# Patient Record
Sex: MALE | Race: WHITE | Employment: STUDENT | URBAN - METROPOLITAN AREA
[De-identification: names, ages, dates, MRNs, and addresses within clinical notes are randomized per-mention and may not be internally consistent; named-entity substitution may affect disease eponyms.]

---

## 2021-05-25 ENCOUNTER — HOSPITAL ENCOUNTER (EMERGENCY)
Age: 21
Discharge: HOME | End: 2021-05-25
Attending: EMERGENCY MEDICINE
Payer: COMMERCIAL

## 2021-05-25 VITALS
DIASTOLIC BLOOD PRESSURE: 79 MMHG | SYSTOLIC BLOOD PRESSURE: 161 MMHG | OXYGEN SATURATION: 97 % | RESPIRATION RATE: 16 BRPM | HEART RATE: 69 BPM | TEMPERATURE: 98.7 F | BODY MASS INDEX: 23.08 KG/M2 | HEIGHT: 72 IN | WEIGHT: 170.42 LBS

## 2021-05-25 DIAGNOSIS — R59.9 ENLARGED LYMPH NODE: Primary | ICD-10-CM

## 2021-05-25 PROCEDURE — 99282 EMERGENCY DEPT VISIT SF MDM: CPT

## 2021-05-25 RX ORDER — CEPHALEXIN 500 MG/1
500 CAPSULE ORAL 2 TIMES DAILY
Qty: 14 CAPSULE | Refills: 0 | Status: SHIPPED | OUTPATIENT
Start: 2021-05-25 | End: 2021-06-01

## 2021-05-25 NOTE — ED PROVIDER NOTES
This is a 23-year-old male with no reported past medical history presents ER today for evaluation of suspected swollen lymph node. Patient states that he noticed a large swollen lymph node on his left lower neck 2 days ago which has been persistent since he first noticed it. He denies a history of similar symptoms. He denies any fever/chills, night sweats, weight loss, appetite changes, abdominal pain, recent dental procedures, skin/scalp lesions, oral lesions, neck pain/stiffness. There is no known family history of leukemia or lymphoma. History reviewed. No pertinent past medical history. Past Surgical History:   Procedure Laterality Date    HX HEENT      dental    HX HERNIA REPAIR           History reviewed. No pertinent family history. Social History     Socioeconomic History    Marital status: SINGLE     Spouse name: Not on file    Number of children: Not on file    Years of education: Not on file    Highest education level: Not on file   Occupational History    Not on file   Tobacco Use    Smoking status: Never Smoker    Smokeless tobacco: Never Used   Substance and Sexual Activity    Alcohol use: Not Currently    Drug use: Not on file    Sexual activity: Not on file   Other Topics Concern    Not on file   Social History Narrative    Not on file     Social Determinants of Health     Financial Resource Strain:     Difficulty of Paying Living Expenses:    Food Insecurity:     Worried About Running Out of Food in the Last Year:     [address] in the Last Year:    Transportation Needs:     Lack of Transportation (Medical):      Lack of Transportation (Non-Medical):    Physical Activity:     Days of Exercise per Week:     Minutes of Exercise per Session:    Stress:     Feeling of Stress :    Social Connections:     Frequency of Communication with Friends and Family:     Frequency of Social Gatherings with Friends and Family:     Attends Jewish Services:     Active Member of Clubs or Organizations:     Attends Club or Organization Meetings:     Marital Status:    Intimate Partner Violence:     Fear of Current or Ex-Partner:     Emotionally Abused:     Physically Abused:     Sexually Abused: ALLERGIES: Sulfa (sulfonamide antibiotics)    Review of Systems   Constitutional: Negative for chills and fever. HENT: Negative for dental problem, sinus pressure, sore throat and trouble swallowing. Eyes: Negative for visual disturbance. Respiratory: Negative for shortness of breath. Cardiovascular: Negative for palpitations. Gastrointestinal: Negative for vomiting. Genitourinary: Negative for dysuria. Musculoskeletal: Negative for myalgias. Skin: Negative for color change, rash and wound. Allergic/Immunologic: Negative for immunocompromised state. Neurological: Negative for dizziness. Hematological: Positive for adenopathy. Psychiatric/Behavioral: Negative for dysphoric mood. Vitals:    05/25/21 1730 05/25/21 1731 05/25/21 1732 05/25/21 1733   BP:       Pulse:       Resp:       Temp:       SpO2: 97% 98% 97% 97%   Weight:       Height:                Physical Exam  Vitals and nursing note reviewed. Constitutional:       Appearance: Normal appearance. HENT:      Head: Normocephalic and atraumatic. Comments: No skin lesions     Mouth/Throat:      Comments: Normal dentition. No lesions on oral or buccal mucosa. Eyes:      Extraocular Movements: Extraocular movements intact. Cardiovascular:      Rate and Rhythm: Normal rate. Pulmonary:      Effort: Pulmonary effort is normal.   Abdominal:      General: There is no distension. Musculoskeletal:         General: Normal range of motion. Cervical back: Neck supple. Lymphadenopathy:      Head:      Right side of head: No submental, submandibular, tonsillar, preauricular, posterior auricular or occipital adenopathy.       Left side of head: No submental, submandibular, tonsillar, preauricular, posterior auricular or occipital adenopathy. Cervical: Cervical adenopathy present. Comments: Single large isolated lymph node that does not appear to be fixed; located in the left cervical distribution. Minimal tenderness with palpation of the lymph node. Skin:     General: Skin is warm and dry. Coloration: Skin is not pale. Neurological:      Mental Status: He is alert and oriented to person, place, and time. Gait: Gait normal.   Psychiatric:         Behavior: Behavior normal.          MDM      VITAL SIGNS:  Patient Vitals for the past 4 hrs:   Temp Pulse Resp BP SpO2   05/25/21 1733     97 %   05/25/21 1732     97 %   05/25/21 1731     98 %   05/25/21 1730     97 %   05/25/21 1729     97 %   05/25/21 1728     96 %   05/25/21 1727     96 %   05/25/21 1726     97 %   05/25/21 1725     97 %   05/25/21 1724     95 %   05/25/21 1723     96 %   05/25/21 1713     97 %   05/25/21 1712     97 %   05/25/21 1711 98.7 °F (37.1 °C) 69 16 (!) 161/79 97 %   05/25/21 1710     97 %   05/25/21 1709     97 %   05/25/21 1708     97 %         LABS:  No results found for this or any previous visit (from the past 6 hour(s)). IMAGING:  No orders to display         Medications During Visit:  Medications - No data to display      DECISION MAKING:  Ron Browne is a 23 y.o. male who comes in as above. Large isolated lymph node nonspecific patient referred to ENT for further management. Based on its' size, he will likely need an outpatient biopsy. Will treat with Keflex for 7 days in the meantime. The clinical decision making for this encounter included ordering and interpreting the above diagnostic tests with comparison to prior studies that are within our EMR. Past medical and surgical histories were reviewed, as were records from recent outpatient and emergency department visits.     The above results discussed and reviewed with the patient. Patient verbalized understanding of the care plan, including any changes to current outpatient medication regimen, discussed disease process, symptom control, and follow-up care. Return precautions reviewed. IMPRESSION:  1. Enlarged lymph node        DISPOSITION:  Discharged      Current Discharge Medication List      START taking these medications    Details   cephALEXin (Keflex) 500 mg capsule Take 1 Capsule by mouth two (2) times a day for 7 days. Qty: 14 Capsule, Refills: 0  Start date: 5/25/2021, End date: 6/1/2021              Follow-up Information     Follow up With Specialties Details Why Contact Info    Brenda Rene MD Pediatric Medicine, Pediatric Medicine  As needed [id number] [facility]  [phone]      Myrna Issa MD Otolaryngology Schedule an appointment as soon as possible for a visit   [address] [place] [address]  [phone]              The patient is asked to follow-up with their primary care provider in the next several days. They are to call tomorrow for an appointment. The patient is asked to return promptly for any increased concerns or worsening of symptoms. They can return to this emergency department or any other emergency department.

## 2021-05-25 NOTE — ED TRIAGE NOTES
Pt reports he noticed what he thinks is a swollen lymph node in the L side of his neck since yesterday. Pt reports swelling has decreased but is still somewhat present. Pt denies pain.

## 2021-05-25 NOTE — DISCHARGE INSTRUCTIONS
You do seem to have a large lymph node, which is somewhat nonspecific. Please take Keflex to see if this helps to reduce the size. Follow-up promptly with ENT to see if they need to perform a biopsy of the lymph node.

## 2021-05-28 ENCOUNTER — TRANSCRIBE ORDER (OUTPATIENT)
Dept: SCHEDULING | Age: 21
End: 2021-05-28

## 2021-05-28 DIAGNOSIS — R22.1 NECK MASS: Primary | ICD-10-CM

## 2021-06-15 ENCOUNTER — HOSPITAL ENCOUNTER (OUTPATIENT)
Dept: ULTRASOUND IMAGING | Age: 21
Discharge: HOME | End: 2021-06-15
Payer: COMMERCIAL

## 2021-06-15 DIAGNOSIS — R22.1 NECK MASS: ICD-10-CM

## 2021-06-15 PROCEDURE — 76536 US EXAM OF HEAD AND NECK: CPT | Performed by: OTOLARYNGOLOGY

## 2021-06-18 ENCOUNTER — HOSPITAL ENCOUNTER (OUTPATIENT)
Dept: ULTRASOUND IMAGING | Age: 21
Discharge: HOME | End: 2021-06-18
Attending: OTOLARYNGOLOGY
Payer: COMMERCIAL

## 2021-06-18 ENCOUNTER — TRANSCRIBE ORDER (OUTPATIENT)
Dept: SCHEDULING | Age: 21
End: 2021-06-18

## 2021-06-18 DIAGNOSIS — R22.1 NECK MASS: ICD-10-CM

## 2021-06-18 DIAGNOSIS — R22.1 NECK MASS: Primary | ICD-10-CM

## 2021-06-18 PROCEDURE — 76536 US EXAM OF HEAD AND NECK: CPT

## 2021-06-18 NOTE — DISCHARGE INSTRUCTIONS
[facility]  Department of Radiology  [phone] Ultrasound Department  [phone] Emergency Department    BIOPSY DISCHARGE INSTRUCTIONS for [place]- on Friday June 18, 2021    General Instructions:  - A biopsy is the removal of a small piece of tissue for microscopic examination or testing.  - Healthy tissue can be obtained for the purpose of tissue-type matching for transplants. - Unhealthy tissues are more commonly biopsied to diagnose disease. General Biopsy:  - A mass can grow in any area of the body, and we are taking a specimen as ordered by your doctor. - The risks are the same. They include bleeding, pain, and infection. Home Care Instructions:  - You may resume your regular diet and medication regimen. - You may use over the counter acetaminophen (Tylenol) or ibuprofen (Advil) for the soreness. - You may apply an ice pack to the affected area for 20-30 minutes at time for the first 24 hours. -- After that, you may apply a heat pack. - You may remove the bandaid(s) tonight. - You may take a shower tonight. - Please keep the site clean and dry for 24-48 hours. - Do not soak in any kind of water (bath tub, hot tub, pool, ocean, etc) for 24-48 hours. - Do not participate in any kind of activity making you vigorously sweat for 24-48 hours. - Do not use any moisturizer/shaving cream/aftershave/cologne on the site for 24-48 hours. Call If:  - You should call your doctor if you have any questions or concerns about the biopsy site. - Call if you should have increased pain, fever, redness, drainage, or bleeding more than a small spot on the bandage.       Follow up with your doctor as requested.      _____________________________   _____________________________  Patient Signature      Technologist Signature

## 2021-12-10 NOTE — ED NOTES
Pt ambulatory out of ED with discharge instructions and prescriptions in hand given by Dr. Mechelle Treviño and Dyan Leija, NP; pt verbalized understanding of discharge paperwork and time allotted for questions. VSS. Pt alert and oriented. Pt accompanied by mother. 4 = No assist / stand by assistance